# Patient Record
Sex: MALE | Race: WHITE | Employment: OTHER | URBAN - METROPOLITAN AREA
[De-identification: names, ages, dates, MRNs, and addresses within clinical notes are randomized per-mention and may not be internally consistent; named-entity substitution may affect disease eponyms.]

---

## 2017-01-01 ENCOUNTER — ASST LIVING (OUTPATIENT)
Dept: FAMILY MEDICINE CLINIC | Facility: CLINIC | Age: 82
End: 2017-01-01

## 2017-01-01 VITALS
HEART RATE: 78 BPM | BODY MASS INDEX: 23 KG/M2 | WEIGHT: 144 LBS | TEMPERATURE: 98 F | SYSTOLIC BLOOD PRESSURE: 166 MMHG | OXYGEN SATURATION: 98 % | DIASTOLIC BLOOD PRESSURE: 70 MMHG

## 2017-01-01 DIAGNOSIS — Z91.81 AT RISK FOR FALLING: ICD-10-CM

## 2017-01-01 DIAGNOSIS — I10 ESSENTIAL HYPERTENSION: ICD-10-CM

## 2017-01-01 DIAGNOSIS — E03.9 ACQUIRED HYPOTHYROIDISM: Primary | ICD-10-CM

## 2017-01-17 ENCOUNTER — HOME CARE VISIT (OUTPATIENT)
Dept: FAMILY MEDICINE CLINIC | Facility: CLINIC | Age: 82
End: 2017-01-17

## 2017-01-17 VITALS
SYSTOLIC BLOOD PRESSURE: 138 MMHG | DIASTOLIC BLOOD PRESSURE: 72 MMHG | TEMPERATURE: 97 F | BODY MASS INDEX: 21 KG/M2 | WEIGHT: 136 LBS | HEART RATE: 74 BPM | OXYGEN SATURATION: 94 %

## 2017-01-17 DIAGNOSIS — L82.1 SEBORRHEIC KERATOSIS: ICD-10-CM

## 2017-01-17 DIAGNOSIS — R07.89 ANTERIOR CHEST WALL PAIN: Primary | ICD-10-CM

## 2017-01-17 NOTE — PROGRESS NOTES
/72 mmHg  Pulse 74  Temp(Src) 97.4 °F (36.3 °C) (Oral)  Wt 136 lb  SpO2 94%              Patient presents with:  Fall: [place]-- Pt fell while getting out of the shower on 1/13/16.  He has pain on the left side of his chest. Pt takes advil for th reflux      with hiatal hernia   • Hard of hearing, bilateral    • Arrhythmia      aflutter   • Cancer [facility])      prostate   • Cancer ([facility] [address]      skin cancer in the R side face that was removed 5/9/2016   • High blood pressure    • Visual impairment      ma Refills for this Visit:  No prescriptions requested or ordered in this encounter  No orders of the defined types were placed in this encounter.            Hasmukh Ny MD   [facility] 26  [address] [facility] [address] [place] [id number]

## 2017-08-11 PROBLEM — H91.93 BILATERAL HEARING LOSS, UNSPECIFIED HEARING LOSS TYPE: Status: ACTIVE | Noted: 2017-01-01

## 2017-08-11 PROBLEM — R60.9 EDEMA, UNSPECIFIED TYPE: Status: ACTIVE | Noted: 2017-01-01

## 2017-11-28 NOTE — PROGRESS NOTES
BP (!) 166/70   Pulse 78   Temp 97.7 °F (36.5 °C) (Temporal)   Wt 144 lb   SpO2 98%   BMI 22.55 kg/m²               Patient presents with:  Hot Flashes: sx 1 month  Test Results: lab results from [place] patient has in hand       HPI;    Dom Gates is a 80 Diagnosis Date   • Arrhythmia     aflutter   • Cancer [facility])     prostate   • Cancer ([facility] [address]     skin cancer in the R side face that was removed 5/9/2016   • Esophageal reflux     with hiatal hernia   • Hard of hearing, bilateral    • Hiatal hernia    • Hig vision  Patient is advised to use a walker or the cane  He may need to see the ophthalmologist again    The patient indicates understanding of these issues and agrees to the plan.   Imaging & Consults:  None  Meds & Refills for this Visit:  No prescriptions

## 2018-01-01 ENCOUNTER — APPOINTMENT (OUTPATIENT)
Dept: CV DIAGNOSTICS | Facility: HOSPITAL | Age: 83
DRG: 065 | End: 2018-01-01
Attending: INTERNAL MEDICINE
Payer: MEDICARE

## 2018-01-01 ENCOUNTER — HOSPITAL ENCOUNTER (INPATIENT)
Facility: HOSPITAL | Age: 83
LOS: 2 days | Discharge: NURSING, REHAB OR LONG-TERM CARE | DRG: 065 | End: 2018-01-01
Attending: EMERGENCY MEDICINE | Admitting: INTERNAL MEDICINE
Payer: MEDICARE

## 2018-01-01 VITALS
BODY MASS INDEX: 18.9 KG/M2 | SYSTOLIC BLOOD PRESSURE: 140 MMHG | DIASTOLIC BLOOD PRESSURE: 87 MMHG | RESPIRATION RATE: 18 BRPM | OXYGEN SATURATION: 98 % | WEIGHT: 132 LBS | HEIGHT: 70 IN | TEMPERATURE: 98 F | HEART RATE: 100 BPM

## 2018-01-01 DIAGNOSIS — I63.9 ACUTE CVA (CEREBROVASCULAR ACCIDENT) (HCC): Primary | ICD-10-CM

## 2018-01-01 PROCEDURE — 82962 GLUCOSE BLOOD TEST: CPT

## 2018-01-01 PROCEDURE — 97530 THERAPEUTIC ACTIVITIES: CPT

## 2018-01-01 PROCEDURE — 80053 COMPREHEN METABOLIC PANEL: CPT | Performed by: EMERGENCY MEDICINE

## 2018-01-01 PROCEDURE — 93306 TTE W/DOPPLER COMPLETE: CPT | Performed by: INTERNAL MEDICINE

## 2018-01-01 PROCEDURE — 84425 ASSAY OF VITAMIN B-1: CPT | Performed by: OTHER

## 2018-01-01 PROCEDURE — 97161 PT EVAL LOW COMPLEX 20 MIN: CPT

## 2018-01-01 PROCEDURE — 85025 COMPLETE CBC W/AUTO DIFF WBC: CPT | Performed by: INTERNAL MEDICINE

## 2018-01-01 PROCEDURE — 99285 EMERGENCY DEPT VISIT HI MDM: CPT

## 2018-01-01 PROCEDURE — 86780 TREPONEMA PALLIDUM: CPT | Performed by: OTHER

## 2018-01-01 PROCEDURE — 36415 COLL VENOUS BLD VENIPUNCTURE: CPT

## 2018-01-01 PROCEDURE — 93010 ELECTROCARDIOGRAM REPORT: CPT

## 2018-01-01 PROCEDURE — 92610 EVALUATE SWALLOWING FUNCTION: CPT

## 2018-01-01 PROCEDURE — 85025 COMPLETE CBC W/AUTO DIFF WBC: CPT | Performed by: EMERGENCY MEDICINE

## 2018-01-01 PROCEDURE — 93005 ELECTROCARDIOGRAM TRACING: CPT

## 2018-01-01 PROCEDURE — 85610 PROTHROMBIN TIME: CPT | Performed by: INTERNAL MEDICINE

## 2018-01-01 PROCEDURE — 80048 BASIC METABOLIC PNL TOTAL CA: CPT | Performed by: INTERNAL MEDICINE

## 2018-01-01 PROCEDURE — 80061 LIPID PANEL: CPT | Performed by: INTERNAL MEDICINE

## 2018-01-01 PROCEDURE — 82607 VITAMIN B-12: CPT | Performed by: OTHER

## 2018-01-01 PROCEDURE — 82746 ASSAY OF FOLIC ACID SERUM: CPT | Performed by: OTHER

## 2018-01-01 PROCEDURE — 99211 OFF/OP EST MAY X REQ PHY/QHP: CPT

## 2018-01-01 PROCEDURE — 97535 SELF CARE MNGMENT TRAINING: CPT

## 2018-01-01 PROCEDURE — 86480 TB TEST CELL IMMUN MEASURE: CPT | Performed by: OTHER

## 2018-01-01 PROCEDURE — 83036 HEMOGLOBIN GLYCOSYLATED A1C: CPT | Performed by: INTERNAL MEDICINE

## 2018-01-01 PROCEDURE — 83921 ORGANIC ACID SINGLE QUANT: CPT | Performed by: OTHER

## 2018-01-01 PROCEDURE — 84132 ASSAY OF SERUM POTASSIUM: CPT | Performed by: INTERNAL MEDICINE

## 2018-01-01 PROCEDURE — 97165 OT EVAL LOW COMPLEX 30 MIN: CPT

## 2018-01-01 RX ORDER — ONDANSETRON 2 MG/ML
4 INJECTION INTRAMUSCULAR; INTRAVENOUS EVERY 6 HOURS PRN
Status: DISCONTINUED | OUTPATIENT
Start: 2018-01-01 | End: 2018-01-01

## 2018-01-01 RX ORDER — HYDRALAZINE HYDROCHLORIDE 50 MG/1
50 TABLET, FILM COATED ORAL 2 TIMES DAILY
Qty: 60 TABLET | Refills: 0 | Status: SHIPPED | OUTPATIENT
Start: 2018-01-01 | End: 2018-01-01

## 2018-01-01 RX ORDER — MULTIVITAMIN WITH FOLIC ACID 400 MCG
1 TABLET ORAL DAILY
COMMUNITY

## 2018-01-01 RX ORDER — AMLODIPINE BESYLATE 10 MG/1
10 TABLET ORAL DAILY
Status: DISCONTINUED | OUTPATIENT
Start: 2018-01-01 | End: 2018-01-01

## 2018-01-01 RX ORDER — AMLODIPINE BESYLATE AND ATORVASTATIN CALCIUM 5; 80 MG/1; MG/1
1 TABLET, FILM COATED ORAL DAILY
Status: ON HOLD | COMMUNITY
End: 2018-01-01

## 2018-01-01 RX ORDER — HEPARIN SODIUM 5000 [USP'U]/ML
5000 INJECTION, SOLUTION INTRAVENOUS; SUBCUTANEOUS EVERY 8 HOURS SCHEDULED
Status: DISCONTINUED | OUTPATIENT
Start: 2018-01-01 | End: 2018-01-01

## 2018-01-01 RX ORDER — ASPIRIN 81 MG/1
81 TABLET ORAL DAILY
Status: DISCONTINUED | OUTPATIENT
Start: 2018-01-01 | End: 2018-01-01

## 2018-01-01 RX ORDER — ACETAMINOPHEN 325 MG/1
650 TABLET ORAL EVERY 6 HOURS PRN
Status: DISCONTINUED | OUTPATIENT
Start: 2018-01-01 | End: 2018-01-01

## 2018-01-01 RX ORDER — SODIUM CHLORIDE 9 MG/ML
INJECTION, SOLUTION INTRAVENOUS CONTINUOUS
Status: ACTIVE | OUTPATIENT
Start: 2018-01-01 | End: 2018-01-01

## 2018-01-01 RX ORDER — SODIUM PHOSPHATE, DIBASIC AND SODIUM PHOSPHATE, MONOBASIC 7; 19 G/133ML; G/133ML
1 ENEMA RECTAL ONCE AS NEEDED
Status: DISCONTINUED | OUTPATIENT
Start: 2018-01-01 | End: 2018-01-01

## 2018-01-01 RX ORDER — SODIUM CHLORIDE 9 MG/ML
INJECTION, SOLUTION INTRAVENOUS CONTINUOUS
Status: DISCONTINUED | OUTPATIENT
Start: 2018-01-01 | End: 2018-01-01

## 2018-01-01 RX ORDER — POTASSIUM CHLORIDE 20 MEQ/1
40 TABLET, EXTENDED RELEASE ORAL ONCE
Status: COMPLETED | OUTPATIENT
Start: 2018-01-01 | End: 2018-01-01

## 2018-01-01 RX ORDER — AMLODIPINE BESYLATE 10 MG/1
10 TABLET ORAL DAILY
Qty: 90 TABLET | Refills: 0 | Status: SHIPPED | OUTPATIENT
Start: 2018-01-01 | End: 2018-01-01

## 2018-01-01 RX ORDER — ONDANSETRON 2 MG/ML
4 INJECTION INTRAMUSCULAR; INTRAVENOUS EVERY 4 HOURS PRN
Status: DISCONTINUED | OUTPATIENT
Start: 2018-01-01 | End: 2018-01-01

## 2018-01-01 RX ORDER — HYDRALAZINE HYDROCHLORIDE 50 MG/1
50 TABLET, FILM COATED ORAL 2 TIMES DAILY
Status: DISCONTINUED | OUTPATIENT
Start: 2018-01-01 | End: 2018-01-01

## 2018-01-01 RX ORDER — LEVOTHYROXINE SODIUM 0.05 MG/1
50 TABLET ORAL
Status: DISCONTINUED | OUTPATIENT
Start: 2018-01-01 | End: 2018-01-01

## 2018-01-01 RX ORDER — ASPIRIN 325 MG
325 TABLET, DELAYED RELEASE (ENTERIC COATED) ORAL DAILY
Status: DISCONTINUED | OUTPATIENT
Start: 2018-01-01 | End: 2018-01-01

## 2018-01-01 RX ORDER — POTASSIUM CHLORIDE 20 MEQ/1
40 TABLET, EXTENDED RELEASE ORAL EVERY 4 HOURS
Status: COMPLETED | OUTPATIENT
Start: 2018-01-01 | End: 2018-01-01

## 2018-01-01 RX ORDER — AMLODIPINE BESYLATE 5 MG/1
5 TABLET ORAL DAILY
Status: DISCONTINUED | OUTPATIENT
Start: 2018-01-01 | End: 2018-01-01

## 2018-01-01 RX ORDER — BISACODYL 10 MG
10 SUPPOSITORY, RECTAL RECTAL
Status: DISCONTINUED | OUTPATIENT
Start: 2018-01-01 | End: 2018-01-01

## 2018-01-01 RX ORDER — DOCUSATE SODIUM 100 MG/1
100 CAPSULE, LIQUID FILLED ORAL 2 TIMES DAILY
Status: DISCONTINUED | OUTPATIENT
Start: 2018-01-01 | End: 2018-01-01

## 2018-01-01 RX ORDER — METOCLOPRAMIDE HYDROCHLORIDE 5 MG/ML
10 INJECTION INTRAMUSCULAR; INTRAVENOUS EVERY 8 HOURS PRN
Status: DISCONTINUED | OUTPATIENT
Start: 2018-01-01 | End: 2018-01-01

## 2018-01-01 RX ORDER — ATORVASTATIN CALCIUM 80 MG/1
80 TABLET, FILM COATED ORAL NIGHTLY
Qty: 30 TABLET | Refills: 0 | Status: SHIPPED | OUTPATIENT
Start: 2018-01-01 | End: 2018-01-01

## 2018-01-01 RX ORDER — HYDRALAZINE HYDROCHLORIDE 20 MG/ML
20 INJECTION INTRAMUSCULAR; INTRAVENOUS ONCE
Status: COMPLETED | OUTPATIENT
Start: 2018-01-01 | End: 2018-01-01

## 2018-01-01 RX ORDER — ATORVASTATIN CALCIUM 80 MG/1
80 TABLET, FILM COATED ORAL NIGHTLY
Status: DISCONTINUED | OUTPATIENT
Start: 2018-01-01 | End: 2018-01-01

## 2018-01-01 RX ORDER — POLYETHYLENE GLYCOL 3350 17 G/17G
17 POWDER, FOR SOLUTION ORAL DAILY PRN
Status: DISCONTINUED | OUTPATIENT
Start: 2018-01-01 | End: 2018-01-01

## 2018-01-01 RX ORDER — PANTOPRAZOLE SODIUM 20 MG/1
20 TABLET, DELAYED RELEASE ORAL DAILY
Status: DISCONTINUED | OUTPATIENT
Start: 2018-01-01 | End: 2018-01-01

## 2018-06-04 PROBLEM — I63.9 ACUTE CVA (CEREBROVASCULAR ACCIDENT) (HCC): Status: ACTIVE | Noted: 2018-01-01

## 2018-06-04 NOTE — ED INITIAL ASSESSMENT (HPI)
81 y/o male to ED with c/o of abnormal MRI. Patient reports he has been having vision changes and memory loss that has been going on for approx x6 weeks. MRI showed multifocal strokes.

## 2018-06-04 NOTE — ED PROVIDER NOTES
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 Muscle strength is 5 out of 5 sensory exam is normal.  His finger to nose on the right side is slightly more dysmetric compared to the left.          ED Course     Labs Reviewed   COMP METABOLIC PANEL (14) - Abnormal; Notable for the following: disease  The EKG shows some nonspecific ST changes. When compared to an old EKG the atrial flutter is not new this is chronic. There is no acute ST elevations or ischemic findings.   The rest of the EKG including rate rhythm axis and intervals I agree wit

## 2018-06-04 NOTE — H&P
[facility] Hospitalist H&P       CC: Patient presents with:  Stroke (neurologic)       PCP: Sebastien Freitas MD    History of Present Illness:Mr. Orestes Watkins is a 79 yo male with PMH of atrial flutter, prostate cancer, GERD, HTN who presented to the ED with an abno Place 1 drop into both eyes daily. Disp:  Rfl:    Amlodipine-Atorvastatin 5-80 MG Oral Tab Take 1 tablet by mouth daily. Disp:  Rfl:    Multiple Vitamin (TAB-A-ALBERT) Oral Tab Take 1 tablet by mouth daily.  Disp:  Rfl:    Levothyroxine Sodium 50 MCG Oral Tab normal mood/affect        Diagnostic Data:    CBC/Chem  Recent Labs   Lab  06/04/18   1727   WBC  5.8   HGB  12.8*   MCV  91.4   PLT  172.0       Recent Labs   Lab  06/04/18   1727   NA  141   K  3.2*   CL  106   CO2  27.0   BUN  21*   CREATSERUM  1.07   G The ventricles and sulci prominent compatible with diffuse cerebral volume loss. . No mass effect, midline shift, definite intra-axial or extra-axial masses or areas of abnormal enhancement seen.  Midline craniocervical structures appear grossly unremarkable

## 2018-06-05 NOTE — PLAN OF CARE
Pt alert, oriented to person and place and at times situation. Disoriented to time, unable to state the month. Expressive aphasia noted at time. Left pupil slightly larger than right, pt has very poor vision from left eye at baseline.  Neuro checks q2h per

## 2018-06-05 NOTE — CONSULTS
[facility] Cardiology Consultation    Bonnie Guido Patient Status:  Inpatient    3/17/1920 MRN [id number]   [facility] 7NE-A Attending Davi Veronica MD   Hosp Day # 1 PCP Melyssa Lara MD     Reason for Consultation:  Atrial flutt Carboxymethylcellulose Sodium  1 drop Both Eyes Daily   • Levothyroxine Sodium  50 mcg Oral Before breakfast   • Metoprolol Succinate ER  75 mg Oral Daily Beta Blocker   • Pantoprazole Sodium  20 mg Oral Daily   • atorvastatin  80 mg Oral Nightly   • Hepar last 168 hours. Impression:   1. CAD with remote CAD - no sx's of angina. Normal EF 5/16 echo  2. Persistent a fib/flutter - ASA and toprol  3. Hiatal hernia  4. Fall risk - hip fx 2015  5. Sun'aq  6.  Visual hallucinations, abnormal MRI brain which may

## 2018-06-05 NOTE — OCCUPATIONAL THERAPY NOTE
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 : A Little (supervision)  -   Putting on and taking off regular upper body clothing?: A Little (set-up)  -   Taking care of personal grooming such as brushing teeth?: A Lit These deficits impact the patient’s ability to participate in ADL, instrumental activities of daily living, rest and sleep, work, leisure and social participation.   Recommend discharge back to Independent living facility with supervision during these tasks

## 2018-06-05 NOTE — PROGRESS NOTES
06/05/18 3719   Clinical Encounter Type   Visited With Patient   Sacramental Encounters   Sacrament of Sick-Anointing Anointed  (Father Mirna Jefferson provided prayer, Scripture, support and Sacrament of the Sick.   to remain available at pager [phone].)

## 2018-06-05 NOTE — PLAN OF CARE
NEUROLOGICAL - ADULT    • Achieves stable or improved neurological status Progressing    • Achieves maximal functionality and self care Progressing            Received report at 0700. Patient alert and oriented x1-2. No complaints of pain.  Neuro checks q4

## 2018-06-05 NOTE — SLP NOTE
ADULT SWALLOWING EVALUATION    ASSESSMENT    ASSESSMENT/OVERALL IMPRESSION:  Pt seen at bedside this PM for bedside swallow evaluation. Pt diagnosed with bilateral occipital lobe infarcts vs PRES. Speech consulted per CVA protocol.  Pt cooperative, pleasant with hiatal hernia   • Hard of hearing, bilateral    • Hiatal hernia    • High blood pressure    • Hypothyroid    • Macular degeneration    • Unspecified essential hypertension    • Visual impairment     macular degeneration          Diet Prior to Admis Date: 06/06/18    Thank you for your referral.   If you have any questions, please contact San[facility]juany Boyd

## 2018-06-05 NOTE — PROGRESS NOTES
NURSING ADMISSION NOTE      Patient admitted via cart from the ED at 2045. Oriented to room. Safety precautions initiated. Bed in low position. Call light in reach. Pt received confused. [place] with bilateral hearing aides. Impaired vision.  Barbara Canchola

## 2018-06-05 NOTE — CONSULTS
[facility]    PATIENT'S NAME: Vicky Pratt   ATTENDING PHYSICIAN: Price Cuba M.D.   [facility] PHYSICIAN: Lisa Wade M.D.    PATIENT ACCOUNT#:   [de-identified]    LOCATION:  [address] [place] [address]  MEDICAL RECORD #:   [id number]       DATE OF surgery for BCC. MEDICATIONS:  Home medications:  amlodipine/atorvastatin, multivitamin, levothyroxine, metoprolol, vitamin D3, aspirin 81, omeprazole, and eye drops. ALLERGIES:  He has no drug allergies. SOCIAL HISTORY:  Nonsmoker. No alcohol. infarcts. His infarcts do correlate with his vision changes. I would agree that given his age and potential history of imbalance that anticoagulation is risky for this patient and risk outweighs the benefit.   As long as Cardiology agrees with this plan,

## 2018-06-05 NOTE — CM/SW NOTE
Pt is a 81 yo male admitted for CVA. Pt lives at [facility]. PT/OT recommending return to [facility] with supervision.   Spoke with pt's son Nito Hood who said he has been working with [facility] to set up additional se

## 2018-06-05 NOTE — PROGRESS NOTES
[facility] Hospitalist Progress Note     [facility]      SUBJECTIVE:  Patient feeling well  Continues to have vision changes    OBJECTIVE:  Temp:  [97.6 °F (36.4 °C)-98.6 °F (37 °C)] 97.6 °F (36.4 °C)  Pulse:  [56-86] 80  Resp:  [10-23] 18  BP: (132-159)/( of restricted diffusion in the occipital lobes bilaterally with corresponding drop in signal on the ADC map are compatible with multifocal acute infarcts, possibly embolic in nature.  Differential diagnosis includes posterior reversible encephalopathy syndr left ethmoid sinus disease. Meds:     No current outpatient prescriptions on file. Current Facility-Administered Medications:   AmLODIPine Besylate (NORVASC) tab 5 mg 5 mg Oral Daily   aspirin EC EC tab 325 mg 325 mg Oral Daily   Carboxymethylcel admitted with abnormal MRI findings, appreciate neuro and cards input. Questions/concerns were discussed with patient and/or family by bedside.     Candida No  Internal Medicine  [place] Hospitalist

## 2018-06-05 NOTE — PHYSICAL THERAPY NOTE
PHYSICAL THERAPY QUICK EVALUATION - INPATIENT    Room Number: 6860/8531-O  Evaluation Date: 6/5/2018  Presenting Problem: CVA  Physician Order: PT Eval and Treat  HIstory:  Admitted with + MRI as OP, history of memory difficulty and vision changes over s can't always see right'    OBJECTIVE  Precautions: Low vision;Hard of hearing  Fall Risk: High fall risk    WEIGHT BEARING RESTRICTION  Weight Bearing Restriction: None                PAIN ASSESSMENT  Ratin  Location: denies      RANGE OF MOTION AND ST rw flexed posture with supervision for safety due to vision. No LOB noted. Patient denies SOB, visual changes during ambulation. /76.   Discussed with patient plan for return to [facility] facility however may require additional assist due to visual change

## 2018-06-06 NOTE — PLAN OF CARE
Assumed care at 1900. Pt confused, oriented to self and occasionally time. Pleasant and cooperative with staff. Neuro assessment unchanged. Denies any pain. Up with sba and a walker. D/C planning to [facility] with supervision. Needs attended to.  Will

## 2018-06-06 NOTE — CM/SW NOTE
Pt is ready for d/c today. Pt will go back to [facility] with supervision and increased services.

## 2018-06-06 NOTE — DISCHARGE SUMMARY
General Medicine Discharge Summary     Patient ID:  Hi Crum  80year old  3/17/1920    Admit date: 6/4/2018    Discharge date and time: 6/6/18    Attending Physician: Josef López MD     Primary Care Physician: Eva Ott MD     Reason Tab  Take 1 tablet (80 mg total) by mouth nightly. hydrALAzine HCl 50 MG Oral Tab  Take 1 tablet (50 mg total) by mouth 2 (two) times daily.       CONTINUE these medications which have CHANGED    aspirin  MG Oral Tab EC  Take 1 tablet (325 mg total

## 2018-06-06 NOTE — PLAN OF CARE
Assumed care at 0700. Patient alert, oriented to self. No neuro changes. Blood pressure increased 173/102,  notified medications adjusted, B/P this afternoon 140/87. Up with one assist and walker. Ok to discharge per all consults.  Discharged in

## 2018-06-06 NOTE — PROGRESS NOTES
[facility] Cardiology Progress Note        Irving Alcala Patient Status:  Inpatient    3/17/1920 MRN [id number]   [facility] 7NE-A Attending Renato Sheikh MD   Hosp Day # 2 PCP MD Johanny Marquez BUN  21*  16   --    CREATSERUM  1.07  0.78   --    CA  8.8  8.3   --    GLU  135*  90   --        Recent Labs   Lab  06/04/18   1727   ALT  23   AST  30   ALB  3.3*       No results for input(s): TROP, CK in the last 168 hours.     Recent Labs   Lab  06/

## 2018-06-07 NOTE — CM/SW NOTE
06/07/18 0800   Discharge disposition   Name of Facillity/Home Care/Hospice [facility]   Discharge transportation Private car

## 2018-06-25 PROBLEM — Z86.73 HISTORY OF CARDIOEMBOLIC CEREBROVASCULAR ACCIDENT (CVA): Status: ACTIVE | Noted: 2018-01-01
